# Patient Record
Sex: FEMALE | Race: WHITE | NOT HISPANIC OR LATINO | Employment: UNEMPLOYED | URBAN - NONMETROPOLITAN AREA
[De-identification: names, ages, dates, MRNs, and addresses within clinical notes are randomized per-mention and may not be internally consistent; named-entity substitution may affect disease eponyms.]

---

## 2019-05-24 ENCOUNTER — DOCUMENTATION (OUTPATIENT)
Dept: NUTRITION | Facility: HOSPITAL | Age: 64
End: 2019-05-24

## 2019-05-24 NOTE — PROGRESS NOTES
Nutrition Services    Patient Name:  Rosa Jennings  YOB: 1955  MRN: [id number]  Admit Date:  (Not on file)    RD to close pt chart and notify referring provider at this time as pt is not interested in scheduling a nutrition appointment at this time. Thank you.    Electronically signed by:  Maria Esther Aguirre RD  05/24/19 4:12 PM

## 2019-09-03 ENCOUNTER — TRANSCRIBE ORDERS (OUTPATIENT)
Dept: NUTRITION | Facility: HOSPITAL | Age: 64
End: 2019-09-03

## 2019-09-03 DIAGNOSIS — K58.0 IRRITABLE BOWEL SYNDROME WITH DIARRHEA: ICD-10-CM

## 2019-09-03 DIAGNOSIS — K21.00 ESOPHAGITIS, REFLUX: Primary | ICD-10-CM

## 2019-09-24 ENCOUNTER — HOSPITAL ENCOUNTER (OUTPATIENT)
Dept: NUTRITION | Facility: HOSPITAL | Age: 64
Setting detail: RECURRING SERIES
Discharge: HOME | End: 2019-09-24

## 2019-09-24 PROCEDURE — 97802 MEDICAL NUTRITION INDIV IN: CPT | Performed by: DIETITIAN, REGISTERED

## 2019-09-25 VITALS — WEIGHT: 184 LBS | HEIGHT: 67 IN | BODY MASS INDEX: 28.88 KG/M2

## 2019-10-16 ENCOUNTER — HOSPITAL ENCOUNTER (OUTPATIENT)
Dept: NUTRITION | Facility: HOSPITAL | Age: 64
Setting detail: RECURRING SERIES
Discharge: HOME | End: 2019-10-16

## 2019-10-16 VITALS — WEIGHT: 184 LBS | HEIGHT: 67 IN | BODY MASS INDEX: 28.88 KG/M2

## 2019-11-13 ENCOUNTER — HOSPITAL ENCOUNTER (OUTPATIENT)
Dept: NUTRITION | Facility: HOSPITAL | Age: 64
Setting detail: RECURRING SERIES
Discharge: HOME | End: 2019-11-13

## 2019-11-13 VITALS — HEIGHT: 67 IN | BODY MASS INDEX: 28.88 KG/M2 | WEIGHT: 184 LBS

## 2019-11-13 PROCEDURE — 97803 MED NUTRITION INDIV SUBSEQ: CPT | Performed by: DIETITIAN, REGISTERED

## 2021-06-17 ENCOUNTER — TRANSCRIBE ORDERS (OUTPATIENT)
Dept: NUTRITION | Facility: HOSPITAL | Age: 66
End: 2021-06-17

## 2021-06-17 DIAGNOSIS — K58.0 IRRITABLE BOWEL SYNDROME WITH DIARRHEA: Primary | ICD-10-CM

## 2021-07-19 ENCOUNTER — HOSPITAL ENCOUNTER (OUTPATIENT)
Dept: NUTRITION | Facility: HOSPITAL | Age: 66
Setting detail: RECURRING SERIES
Discharge: HOME | End: 2021-07-19

## 2021-07-19 VITALS — BODY MASS INDEX: 30.53 KG/M2 | WEIGHT: 190 LBS | HEIGHT: 66 IN

## 2021-07-19 PROCEDURE — 97802 MEDICAL NUTRITION INDIV IN: CPT | Performed by: DIETITIAN, REGISTERED

## 2021-07-19 NOTE — PROGRESS NOTES
Adult Outpatient Nutrition  Assessment/PES    Patient Name:  Rosa Jennings  YOB: 1955  MRN: [id number]    Assessment Date:  7/19/2021    Comments:  Telephone nutrition consult, 60 minutes. This medical referred consult was provided as a Zoom call as patient is unable to attend an in-office appointment due to the COVID-19 crisis. Consent for treatment was given verbally.    Patient describes problems with altered bowel function 2/2 to IBS. Patient has been seen by our practice several times in relation to low FODMAP diet protocol. Patient has determined intolerance to fructose, gluten and all cow's milk products (suspected milk protein allergy over lactose intolerance). She presents today with questions regarding ways to increase fiber and protein in her diet, as well as general recommendations for weight loss. She reports that she has gained about 20 lbs over the last 18 months, which has worsened the arthritis in her knees.     During the session RD reviewed updated resources and products for patient to try. Also provided recipe handouts. RD did recommend increasing fiber intake gradually to 14 g/1000 kcal. Patient's estimated needs for weight loss are 1400 kcal per day. We reviewed sample menu as well as protein needs (75-85 g pro per day). RD did advise on supplements as well. Recommended Konsyl fiber, 1 tsp every other day and gradually increasing as tolerated. Recommended trial of oats and flax as well.     Goals:  - trial moderate (1/2 cup cooked) portion of oatmeal 3 x per week  - track intake and ~1500 kcal per day    Total of 60 minutes spent with patient on nutrition counseling. Education based on Academy of Dietetics and Nutrition guidelines. Patient was provided with RD's contact information. Follow up visit is scheduled for 8/24/21 at 1:30 pm. Thank you for this referral.      General Info     Row Name 07/19/21 1507       Today's Session    Person(s) attending today's session   "Patient     Services Used Today?  No       General Information    How Well Do You Speak English?  very well    Do You Speak a Language Other Than English at Home?  no    Are you able to read and write English?  Yes    Last grade of school completed  Master's degree    Is patient pregnant?  no          Anthropometrics     Row Name 07/19/21 1505          Anthropometrics    Height  167.6 cm (66\")     Weight  86.2 kg (190 lb)        Ideal Body Weight (IBW)    Ideal Body Weight (IBW) (kg)  59.58     % Ideal Body Weight  144.66        Body Mass Index (BMI)    BMI (kg/m2)  30.73         Nutritional Info/Activity     Row Name 07/19/21 1506       Nutritional Information    Have you had weight changes?  Yes    Describe weight changes  20 lb weight gain during pandemic    What is your desired body weight?  68 kg (150 lb)    Have you tried to lose weight before?  Yes    List programs tried, date, and success  exercise    What is your motivation to lose weight?  feel better, improve health    Food Allergies  milk;other (see comments) follows low FODMAP diet    Supplemental Drinks/Foods/Additives  Vit D    History of eating disorder?  No    What cultural diet influences are important for you to follow?  none listed    Do you have difficulty chewing food?  No    Functional Status  able to prepare meals;able to purchase food;ambulatory    List any food cravings/trigger foods you have  none listed    List any food aversions  none listed    How often during the day do you find yourself snacking?  1-2    Food Behaviors  Stress eater;Boredom eater    How often do you eat out and where?  [facility], once every 2 weeks    Do you use Food Assistance programs (WIC, food stamps, food bank)?  no    Do you need information about Food Assistance programs?  no    How many meals do you eat each day?  2    How many snacks do you eat each day?  1    What is the biggest challenge you have with your diet?  Food causing negative " "symptoms;Weight maintenance    What type of support do you currently use to help you with your health issues?  gym membership    Enter everything you can remember eating in the last 24 hours (1 day)  Breakfast: 2 eggs, 1/2 GF bagel, coffee Lunch: shaved beef on lettuce with tomato and chips, diet coke Dinner: salmon and spinach salad Snack: handful of rice chex with almond milk       Physical Activity    Are you currently involved in an activity/exercise program?   Yes    Describe physical activity  PT, currently has liminted mobility d/t knee arthritis    How would you rank exercise as an important health lifestyle practice?  10        Home Nutrition Report     Row Name 07/19/21 1517 07/19/21 1506       Home Nutrition Report    Diet  Other (comment) low FODMAP  --    Supplemental Drinks/Foods/Additives  --  Vit D        Estimated/Assessed Needs     Row Name 07/19/21 1505          Calculation Measurements    Weight Used For Calculations  86.2 kg (190 lb)     Height  167.6 cm (66\")        Estimated/Assessed Needs    Additional Documentation  [place]-St. Jeor Equation (Group)        [place]-St. Jeor Equation    RMR ([place]-St. Jeor Equation)  1433.58     [place]. Jeor Activity Factors  1.2     Activity Factors ([place]-St. Jeor)  1720.296           Labs/Tests/Procedures/Meds     Row Name 07/19/21 1518          Labs/Procedures/Meds    Lab Results Reviewed  reviewed        Diagnostic Tests/Procedures    Diagnostic Test/Procedure Reviewed  reviewed        Medications    Pertinent Medications Reviewed  reviewed             Problem/Interventions:  Problem 1     Row Name 07/19/21 1518          Nutrition Diagnoses Problem 1    Problem 1  Altered GI Function     Etiology (related to)  Medical Diagnosis     Gastrointestinal  IBS     Signs/Symptoms (evidenced by)  Report/Observation     Reported/Observed By  MD     Reported GI Symptoms  Constipation;Diarrhea         Problem 2     Row Name 07/19/21 1519          Nutrition " Diagnoses Problem 2    Problem 2  Overweight/Obesity     Etiology (related to)  Other (comment) lifestyle/weight gain since pandemic     Signs/Symptoms (evidenced by)  Unintended Weight Change     Unintended Weight Change  Gain     Number of Pounds Gained  20 lbs     Weight gain time period  18 mo               Intervention Goal     Row Name 07/19/21 1525          Intervention Goal    General  Provide information regarding MNT for treatment/condition;Reduce/improve symptoms;Meet nutritional needs for age/condition;Disease management/therapy     Weight  Appropriate weight loss           Nutrition Prescription     Row Name 07/19/21 1528          Nutrition Prescription PO    PO Prescription  Begin/change diet     Begin/Change Diet to  Regular     Fluid Consistency  Thin     New PO Prescription Ordered?  No, recommended         Education/Evaluation     Row Name 07/19/21 1529          Education    Education  Provided education regarding;Education topics;Advised regarding habits/behavior     Provided education regarding  Medical diagnosis;Diet rationale     Education Topics  GI disease     Advised Regarding Habits/Behavior  Food choices;Label reading;Self monitor        Monitor/Evaluation    Monitor  Per protocol     Education Follow-up  Other (comment) 8/24/21           Electronically signed by:  Riddhi Smith RD  07/19/21 16:03 EDT

## 2021-08-24 ENCOUNTER — APPOINTMENT (OUTPATIENT)
Dept: NUTRITION | Facility: HOSPITAL | Age: 66
End: 2021-08-24

## 2021-09-13 ENCOUNTER — HOSPITAL ENCOUNTER (OUTPATIENT)
Dept: NUTRITION | Facility: HOSPITAL | Age: 66
Setting detail: RECURRING SERIES
Discharge: HOME | End: 2021-09-13

## 2021-09-13 NOTE — PROGRESS NOTES
Adult Outpatient Nutrition  Assessment/PES    Patient Name:  Rosa Jennings  YOB: 1955  MRN: [id number]    Assessment Date:  9/13/2021    Comments:  Telephone nutrition consult, 30 minutes. This medical referred consult was provided as a ZOOM visit, as patient is unable to attend an in-office appointment due to the COVID-19 crisis. Consent for treatment was given verbally.    Patient presents for follow up for constipation 2/2 IBS. Patient has known intolerance to fructose, gluten and cow's milk. She did try several varieties of oatmeal as well as flaxseed.  She states that instant oats caused bloating and burping. Steel cut oats did not cause symptoms. She has also tried flaxseeds every other day and those have done well. She has purchased Konsyl but has not yet tried. RD again encouraged that this might be the best tolerated option. In regards to overall dietary balance and tracking, patient has downloaded several apps but has not yet used. She does state intention to start. We did review protein supplement options should she not have an appetite for meat. Patient does not tolerate whey/cows milk and does not tolerate fructans. As a result pea protein may not be well tolerated. Current evidence suggests that soy protein may cause hormonal disruption in post-menopausal women, so RD recommended either egg white protein or collagen peptides as as supplement. Patient states that he has no further questions or concerns at this time. RD provided contact information and instructed to call should further nutrition concerns arise.     Goals:  - trial moderate (1/2 cup cooked) portion of oatmeal 3 x per week, 100% met  - track intake and ~1500 kcal per day, 25% met      Total of 30 minutes spent with patient on nutrition counseling. Education based on Academy of Dietetics and Nutrition guidelines. Patient was provided with RD's contact information. Follow up PRN. Thank you for this  referral    Electronically signed by:  Riddhi Smith RD  09/13/21 14:35 EDT